# Patient Record
(demographics unavailable — no encounter records)

---

## 2024-12-15 NOTE — PHYSICAL EXAM
[No Acute Distress] : no acute distress [Normal Sclera/Conjunctiva] : normal sclera/conjunctiva [No JVD] : no jugular venous distention [No Respiratory Distress] : no respiratory distress  [No Accessory Muscle Use] : no accessory muscle use [Clear to Auscultation] : lungs were clear to auscultation bilaterally [Normal Rate] : normal rate  [Regular Rhythm] : with a regular rhythm [Normal] : no CVA or spinal tenderness [No Joint Swelling] : no joint swelling [No Focal Deficits] : no focal deficits [Normal Gait] : normal gait [Alert and Oriented x3] : oriented to person, place, and time

## 2024-12-15 NOTE — COUNSELING
[Behavioral health counseling provided] : Behavioral health counseling provided [Sleep ___ hours/day] : Sleep [unfilled] hours/day [Engage in a relaxing activity] : Engage in a relaxing activity [Yes] : Risk of tobacco use and health benefits of smoking cessation discussed: Yes [Cessation strategies including cessation program discussed] : Cessation strategies including cessation program discussed [Use of nicotine replacement therapies and other medications discussed] : Use of nicotine replacement therapies and other medications discussed [Encouraged to pick a quit date and identify support needed to quit] : Encouraged to pick a quit date and identify support needed to quit [Smoking Cessation Program Referral] : Smoking Cessation Program Referral  [No] : Not willing to quit smoking [Potential consequences of obesity discussed] : Potential consequences of obesity discussed [Benefits of weight loss discussed] : Benefits of weight loss discussed [Encouraged to maintain food diary] : Encouraged to maintain food diary [Encouraged to increase physical activity] : Encouraged to increase physical activity [Encouraged to use exercise tracking device] : Encouraged to use exercise tracking device [Weigh Self Weekly] : weigh self weekly [Decrease Portions] : decrease portions [____ min/wk Activity] : [unfilled] min/wk activity [Keep Food Diary] : keep food diary [Good understanding] : Patient has a good understanding of lifestyle changes and steps needed to achieve self management goal [FreeTextEntry1] : 3

## 2024-12-15 NOTE — HEALTH RISK ASSESSMENT
[Monthly or less (1 pt)] : Monthly or less (1 point) [1 or 2 (0 pts)] : 1 or 2 (0 points) [Never (0 pts)] : Never (0 points) [No] : In the past 12 months have you used drugs other than those required for medical reasons? No [No falls in past year] : Patient reported no falls in the past year [0] : 2) Feeling down, depressed, or hopeless: Not at all (0) [PHQ-2 Negative - No further assessment needed] : PHQ-2 Negative - No further assessment needed [Former] : Former [20 or more] : 20 or more [de-identified] : 01/23  Quit  [de-identified] : walking  [Audit-CScore] : 0 [de-identified] : regular  [DFH9Cohyp] : 0 [de-identified] : quit 10/24

## 2024-12-15 NOTE — HISTORY OF PRESENT ILLNESS
[FreeTextEntry1] : cc: f/u lipids, sugar, smoking,, Anxiety, [de-identified] : Encounter conducted in [place]. Pt presented for f/u. He is doingwell. Pt quti smoking 6 weeks ago. Pt denies CP.SBB,abd pain, Anxiety stable.

## 2024-12-15 NOTE — HEALTH RISK ASSESSMENT
[Monthly or less (1 pt)] : Monthly or less (1 point) [1 or 2 (0 pts)] : 1 or 2 (0 points) [Never (0 pts)] : Never (0 points) [No] : In the past 12 months have you used drugs other than those required for medical reasons? No [No falls in past year] : Patient reported no falls in the past year [0] : 2) Feeling down, depressed, or hopeless: Not at all (0) [PHQ-2 Negative - No further assessment needed] : PHQ-2 Negative - No further assessment needed [Former] : Former [20 or more] : 20 or more [de-identified] : 01/23  Quit  [de-identified] : walking  [Audit-CScore] : 0 [de-identified] : regular  [SLE7Irdpi] : 0 [de-identified] : quit 10/24

## 2024-12-15 NOTE — HISTORY OF PRESENT ILLNESS
[FreeTextEntry1] : cc: f/u lipids, sugar, smoking,, Anxiety, [de-identified] : Encounter conducted in [place]. Pt presented for f/u. He is doingwell. Pt quti smoking 6 weeks ago. Pt denies CP.SBB,abd pain, Anxiety stable.

## 2024-12-15 NOTE — REVIEW OF SYSTEMS
[Joint Pain] : joint pain [Impotence] : impotence [Muscle Pain] : muscle pain [Anxiety] : anxiety [Depression] : depression [Negative] : Heme/Lymph [Dysuria] : no dysuria [Hesitancy] : no hesitancy [Nocturia] : no nocturia [Hematuria] : no hematuria [Poor Libido] : libido not poor [Suicidal] : not suicidal [Insomnia] : no insomnia [FreeTextEntry9] : left elbow pain .chronic